# Patient Record
Sex: FEMALE | Race: WHITE | NOT HISPANIC OR LATINO | Employment: FULL TIME | URBAN - METROPOLITAN AREA
[De-identification: names, ages, dates, MRNs, and addresses within clinical notes are randomized per-mention and may not be internally consistent; named-entity substitution may affect disease eponyms.]

---

## 2023-06-08 LAB
CHLAMYDIA TRACH., AMPLIFIED: NEGATIVE
N. GONORRHEA, AMPLIFIED: NEGATIVE
TRICHOMONAS VAGINALIS: NEGATIVE

## 2024-02-22 ENCOUNTER — OFFICE VISIT (OUTPATIENT)
Dept: ORTHOPEDIC SURGERY | Facility: CLINIC | Age: 29
End: 2024-02-22
Payer: COMMERCIAL

## 2024-02-22 DIAGNOSIS — G56.03 BILATERAL CARPAL TUNNEL SYNDROME: ICD-10-CM

## 2024-02-22 PROCEDURE — 99203 OFFICE O/P NEW LOW 30 MIN: CPT | Performed by: FAMILY MEDICINE

## 2024-02-22 NOTE — PROGRESS NOTES
History of Present Illness   Chief Complaint   Patient presents with    Left Hand - Pain, Numbness    Right Hand - Pain, Numbness       The patient is 28 y.o. female  here with a complaint of bilateral hand pain and numbness, right greater than left.  Onset of symptoms over the past 9 months, atraumatic in nature.  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  She says that symptoms do tend to be aggravated by this, she does admit to some numbness and tingling when she wakes up in the morning, says she shakes her hand out to help with symptoms.  She points to the thenar eminence as area of discomfort, on her right hand she will get tingling into her middle and index finger, on the left is the middle and ring finger.  She has not done any specific treatment for her symptoms.  She denies any history of similar problems in the past.    History reviewed. No pertinent past medical history.    Medication Documentation Review Audit       Reviewed by Maverick Eli MA (Medical Assistant) on 02/22/24 at 1540      Medication Order Taking? Sig Documenting Provider Last Dose Status            No Medications to Display                                   No Known Allergies    Social History     Socioeconomic History    Marital status: Single     Spouse name: Not on file    Number of children: Not on file    Years of education: Not on file    Highest education level: Not on file   Occupational History    Not on file   Tobacco Use    Smoking status: Every Day     Types: Cigarettes    Smokeless tobacco: Not on file   Substance and Sexual Activity    Alcohol use: Not on file    Drug use: Not on file    Sexual activity: Not on file   Other Topics Concern    Not on file   Social History Narrative    Not on file     Social Determinants of Health     Financial Resource Strain: Not on file   Food Insecurity: Not on file   Transportation Needs: Not on file    Physical Activity: Not on file   Stress: Not on file   Social Connections: Not on file   Intimate Partner Violence: Not on file   Housing Stability: Not on file       History reviewed. No pertinent surgical history.       Review of Systems   GENERAL: Negative  GI: Negative  MUSCULOSKELETAL: See HPI  SKIN: Negative  NEURO:  Negative     Physical Exam:    General/Constitutional: well appearing, no distress, appears stated age  HEENT: sclera clear  Respiratory: non labored breathing  Vascular: No edema, swelling or tenderness, except as noted in detailed exam.  Integumentary: No impressive skin lesions present, except as noted in detailed exam.  Neurological:  Alert and oriented   Psychological:  Normal mood and affect.  Musculoskeletal: Normal, except as noted in detailed exam and in HPI    Lateral hands are normal in appearance, there is no thenar or intrinsic atrophy.  No areas of focal tenderness to palpation.  She has full range of motion of both wrists, there is no motor deficit or pain with strength testing at either wrist.  Negative Finkelstein test bilaterally.  She does have a positive Phalen, more prominent on the right, negative Tinel bilaterally, positive median nerve compression on the right, negative on the left.  There is no motor or sensory deficits of the median, ulnar, radial nerve distributions, 2+ radial pulse       Imaging: No imaging today      Assessment   1. Bilateral carpal tunnel syndrome  EMG & nerve conduction            Plan: Bilateral hand pain/paresthesias consistent with median nerve compression/carpal tunnel syndrome.  We discussed likely diagnosis, further workup and treatment.  I would like to obtain EMG/nerve conduction study of bilateral upper extremities to evaluate for median nerve compression at the wrist.  Patient says that she would be interested in more definitive management with carpal tunnel release if positive.  We discussed alternative treatments including bracing,  injections, therapy.  Further treatment pending EMG results

## 2024-05-17 ENCOUNTER — HOSPITAL ENCOUNTER (OUTPATIENT)
Dept: NEUROLOGY | Facility: HOSPITAL | Age: 29
Discharge: HOME | End: 2024-05-17
Payer: COMMERCIAL

## 2024-05-17 DIAGNOSIS — G56.03 BILATERAL CARPAL TUNNEL SYNDROME: ICD-10-CM

## 2024-05-17 PROCEDURE — 95886 MUSC TEST DONE W/N TEST COMP: CPT | Performed by: PSYCHIATRY & NEUROLOGY

## 2024-05-17 PROCEDURE — 95912 NRV CNDJ TEST 11-12 STUDIES: CPT | Performed by: PSYCHIATRY & NEUROLOGY

## 2024-05-17 PROCEDURE — 95912 NRV CNDJ TEST 11-12 STUDIES: CPT

## 2024-05-28 ENCOUNTER — OFFICE VISIT (OUTPATIENT)
Dept: ORTHOPEDIC SURGERY | Facility: CLINIC | Age: 29
End: 2024-05-28
Payer: COMMERCIAL

## 2024-05-28 ENCOUNTER — HOSPITAL ENCOUNTER (OUTPATIENT)
Dept: RADIOLOGY | Facility: EXTERNAL LOCATION | Age: 29
Discharge: HOME | End: 2024-05-28

## 2024-05-28 DIAGNOSIS — G56.01 CARPAL TUNNEL SYNDROME OF RIGHT WRIST: Primary | ICD-10-CM

## 2024-05-28 DIAGNOSIS — M79.641 HAND PAIN, RIGHT: ICD-10-CM

## 2024-05-28 PROCEDURE — 76942 ECHO GUIDE FOR BIOPSY: CPT | Performed by: FAMILY MEDICINE

## 2024-05-28 PROCEDURE — 20526 THER INJECTION CARP TUNNEL: CPT | Performed by: FAMILY MEDICINE

## 2024-05-28 PROCEDURE — 99213 OFFICE O/P EST LOW 20 MIN: CPT | Performed by: FAMILY MEDICINE

## 2024-05-28 RX ORDER — LIDOCAINE HYDROCHLORIDE 20 MG/ML
1 INJECTION, SOLUTION INFILTRATION; PERINEURAL
Status: COMPLETED | OUTPATIENT
Start: 2024-05-28 | End: 2024-05-28

## 2024-05-28 RX ORDER — TRIAMCINOLONE ACETONIDE 40 MG/ML
40 INJECTION, SUSPENSION INTRA-ARTICULAR; INTRAMUSCULAR
Status: COMPLETED | OUTPATIENT
Start: 2024-05-28 | End: 2024-05-28

## 2024-05-28 RX ADMIN — LIDOCAINE HYDROCHLORIDE 1 ML: 20 INJECTION, SOLUTION INFILTRATION; PERINEURAL at 15:33

## 2024-05-28 RX ADMIN — TRIAMCINOLONE ACETONIDE 40 MG: 40 INJECTION, SUSPENSION INTRA-ARTICULAR; INTRAMUSCULAR at 15:33

## 2024-05-28 NOTE — PROGRESS NOTES
History of Present Illness   Chief Complaint   Patient presents with    Right Hand - Follow-up       The patient is 28 y.o. female  here for follow-up of right hand pain/numbness/EMG review.  Patient was initially seen by me 3 months ago, see previous note for full details.  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  Symptoms, exam findings were consistent with carpal tunnel syndrome, she was referred for EMG/nerve conduction study, did recommend attempt at some nighttime splinting.  She says that her left hand has been asymptomatic but her right hand remains symptomatic.  She says that splinting at night has helped with her nighttime symptoms and morning symptoms but she is still having intermittent symptoms with her work activity, she says majority of discomfort is in the thenar eminence area with numbness into her thumb, occasionally into the tips of her index and middle finger    No past medical history on file.    Medication Documentation Review Audit       Reviewed by Aric Bower MD (Physician) on 02/22/24 at 1609      Medication Order Taking? Sig Documenting Provider Last Dose Status            No Medications to Display                                   No Known Allergies    Social History     Socioeconomic History    Marital status: Single     Spouse name: Not on file    Number of children: Not on file    Years of education: Not on file    Highest education level: Not on file   Occupational History    Not on file   Tobacco Use    Smoking status: Every Day     Types: Cigarettes    Smokeless tobacco: Not on file   Substance and Sexual Activity    Alcohol use: Not on file    Drug use: Not on file    Sexual activity: Not on file   Other Topics  Concern    Not on file   Social History Narrative    Not on file     Social Determinants of Health     Financial Resource Strain: Medium Risk (11/14/2023)    Received from [facility]    Overall Financial Resource Strain (CARDIA)     Difficulty of Paying Living Expenses: Somewhat hard   Food Insecurity: Food Insecurity Present (11/14/2023)    Received from [facility]    Hunger Vital Sign     Worried About Running Out of Food in the Last Year: Sometimes true     Ran Out of Food in the Last Year: Never true   Transportation Needs: No Transportation Needs (11/14/2023)    Received from [facility]    PRAPARE - Transportation     Lack of Transportation (Medical): No     Lack of Transportation (Non-Medical): No   Physical Activity: Sufficiently Active (11/14/2023)    Received from [facility]    Exercise Vital Sign     Days of Exercise per Week: 7 days     Minutes of Exercise per Session: 150+ min   Stress: No Stress Concern Present (11/14/2023)    Received from [facility]    [employer] [place] [employer] - Occupational Stress Questionnaire     Feeling of Stress : Not at all   Social Connections: Unknown (11/14/2023)    Received from [facility]    Social Connection and Isolation Panel [NHANES]     Frequency of Communication with Friends and Family: More than three times a week     Frequency of Social Gatherings with Friends and Family: More than three times a week     Attends Baptism Services: Not on file     Active Member of Clubs or Organizations: Not on file     Attends Club or Organization Meetings: Not on file     Marital Status: Not on file   Intimate Partner Violence: Not on file   Housing Stability: Not on file       No past surgical history on file.       Review of Systems   GENERAL: Negative  GI: Negative  MUSCULOSKELETAL: See HPI  SKIN: Negative  NEURO:  Negative      Physical Exam:    General/Constitutional: well appearing, no distress, appears stated age  HEENT: sclera clear  Respiratory: non labored breathing  Vascular: No edema, swelling or tenderness, except as noted in detailed exam.  Integumentary: No impressive skin lesions present, except as noted in detailed exam.  Neurological:  Alert and oriented   Psychological:  Normal mood and affect.  Musculoskeletal: Normal, except as noted in detailed exam and in HPI    Right hand/wrist: Normal in appearance, there is no thenar or intrinsic atrophy.  There is some tenderness at the carpal tunnel ligament area today more prominent near the radial aspect..  She has full range of motion of both wrists, there is no motor deficit or pain with strength testing at either wrist.  Negative Finkelstein test bilaterally.  She does have a positive Phalen, positive Tinel,, positive median nerve compression.  There is no motor or sensory deficits of the median, ulnar, radial nerve distributions, 2+ radial pulse       Results: EMG was reviewed with patient, unremarkable for carpal tunnel syndrome, other upper extremity neuropathy    Hand / UE Inj/Asp: R carpal tunnel for carpal tunnel syndrome on 5/28/2024 3:33 PM  Indications: diagnostic and pain  Details: 25 G needle, ultrasound-guided volar approach  Medications: 40 mg triamcinolone acetonide 40 mg/mL; 1 mL lidocaine 20 mg/mL (2 %)  Outcome: tolerated well, no immediate complications  Procedure, treatment alternatives, risks and benefits explained, specific risks discussed. Patient was prepped and draped in the usual sterile fashion.             Assessment   1. Carpal tunnel syndrome of right wrist        2. Hand pain, right  Point of Care Ultrasound            Plan: Right hand pain/paresthesias, exam, history suggestive of carpal tunnel syndrome though EMG was unremarkable.  Discussed further workup and treatment.  We did proceed with ultrasound-guided right wrist carpal tunnel injection  for both diagnostic and hopefully therapeutic purposes, she was instructed to reach out to the office in approximately 2 weeks to let us know her response to the injection to help further guide treatment moving forward.  All questions and concerns were answered.

## 2024-10-01 ENCOUNTER — APPOINTMENT (OUTPATIENT)
Dept: ORTHOPEDIC SURGERY | Facility: CLINIC | Age: 29
End: 2024-10-01
Payer: COMMERCIAL

## 2024-10-01 DIAGNOSIS — R20.2 PARESTHESIA OF HAND, BILATERAL: Primary | ICD-10-CM

## 2024-10-01 PROCEDURE — 99213 OFFICE O/P EST LOW 20 MIN: CPT | Performed by: FAMILY MEDICINE

## 2024-10-01 NOTE — PROGRESS NOTES
History of Present Illness   Chief Complaint   Patient presents with    Left Hand - Follow-up    Right Hand - Follow-up       The patient is 29 y.o. female  here for follow-up of bilateral hand pain/numbness.  Patient was last seen by me a little over 4 months ago, see previous note for full details.  In brief patient has been dealing with some ongoing pain, numbness and tingling in bilateral hands, right greater than left over the past year.  At her initial visit with me symptoms consistent with carpal tunnel syndrome bilaterally.  She was sent for EMG, she did have EMG done, and this was negative for any signs of carpal tunnel syndrome/median nerve compression bilaterally, no other neuropathies were seen.  At her visit in May we did proceed with ultrasound-guided carpal tunnel injection for both diagnostic and hopefully therapeutic purposes.  She says that a carpal tunnel injection did provide full resolution of symptoms for approximately 4 months with more recent recurrence of pain in the right hand and wrist, she says around the same time her left hand and wrist started bothering her again.  At her follow-up visit with me in May she says that her left hand was asymptomatic.  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  Pain is most notable in the thenar eminence bilaterally, she gets numbness and tingling into her thumb, index and middle fingers.        No past medical history on file.    Medication Documentation Review Audit       Reviewed by Aric Bower MD (Physician) on 05/28/24 at 1535      Medication Order Taking? Sig Documenting Provider Last Dose Status            No Medications to Display                                   No Known Allergies    Social History     Socioeconomic History    Marital status: Single     Spouse name: Not on file    Number of children: Not on file    Years of education: Not on file    Highest education level: Not on file   Occupational  History    Not on file   Tobacco Use    Smoking status: Every Day     Types: Cigarettes    Smokeless tobacco: Not on file   Substance and Sexual Activity    Alcohol use: Not on file    Drug use: Not on file    Sexual activity: Not on file   Other Topics Concern    Not on file   Social History Narrative    Not on file     Social Determinants of Health     Financial Resource Strain: Medium Risk (11/14/2023)    Received from [facility]    Overall Financial Resource Strain (CARDIA)     Difficulty of Paying Living Expenses: Somewhat hard   Food Insecurity: Food Insecurity Present (11/14/2023)    Received from [facility]    Hunger Vital Sign     Worried About Running Out of Food in the Last Year: Sometimes true     Ran Out of Food in the Last Year: Never true   Transportation Needs: No Transportation Needs (11/14/2023)    Received from [facility]    PRAPARE - Transportation     Lack of Transportation (Medical): No     Lack of Transportation (Non-Medical): No   Physical Activity: Sufficiently Active (11/14/2023)    Received from [facility]    Exercise Vital Sign     Days of Exercise per Week: 7 days     Minutes of Exercise per Session: 150+ min   Stress: No Stress Concern Present (11/14/2023)    Received from [facility]    [employer] [place] [employer] - Occupational Stress Questionnaire     Feeling of Stress : Not at all   Social Connections: Unknown (11/14/2023)    Received from [facility]    Social Connection and Isolation Panel [NHANES]     Frequency of Communication with Friends and Family: More than three times a week     Frequency of Social Gatherings with Friends and Family: More than three times a week     Attends Congregation Services: Not on file     Active Member of Clubs or Organizations: Not on file     Attends Club or Organization Meetings: Not on file     Marital  Status: Not on file   Intimate Partner Violence: Not on file   Housing Stability: Not on file       No past surgical history on file.       Review of Systems   GENERAL: Negative  GI: Negative  MUSCULOSKELETAL: See HPI  SKIN: Negative  NEURO:  Negative     Physical Exam:    General/Constitutional: well appearing, no distress, appears stated age  HEENT: sclera clear  Respiratory: non labored breathing  Vascular: No edema, swelling or tenderness, except as noted in detailed exam.  Integumentary: No impressive skin lesions present, except as noted in detailed exam.  Neurological:  Alert and oriented   Psychological:  Normal mood and affect.  Musculoskeletal: Normal, except as noted in detailed exam and in HPI    Right hand/wrist: Normal in appearance, there is no thenar or intrinsic atrophy.  There is some tenderness at the carpal tunnel ligament area today more prominent near the radial aspect..  She has full range of motion of both wrists, there is no motor deficit or pain with strength testing at either wrist.  Negative Finkelstein test bilaterally.  She does have a positive Phalen, positive Tinel,, positive median nerve compression.  There is no motor or sensory deficits of the median, ulnar, radial nerve distributions, 2+ radial pulse    Left hand/wrist: Normal appearance, no intrinsic or thenar atrophy.  No significant tenderness at the carpal tunnel ligament today.  No other areas of tenderness palpation.  She has good range of motion at the wrist in all directions without pain.  Positive Phalen, negative Tinel, negative median nerve compression on the left.  No motor or sensory deficits the median, ulnar, radial nerve distributions.  2+ radial pulse      Assessment   1. Paresthesia of hand, bilateral                Plan: Bilateral hand pain/paresthesias, symptoms consistent with carpal tunnel syndrome/median nerve compression at the wrist though previous EMG was negative, she did have significant improvement  following carpal tunnel injection of the right wrist little over 4 months ago with recent recurrence of pain.  Discussed further workup and treatment.  I would like patient to follow-up with Dr. Cornejo, discussed possibility of false negative EMG, patient is in agreement with plan.  She will follow-up with him in the next few weeks

## 2024-10-07 ENCOUNTER — OFFICE VISIT (OUTPATIENT)
Dept: ORTHOPEDIC SURGERY | Facility: CLINIC | Age: 29
End: 2024-10-07
Payer: COMMERCIAL

## 2024-10-07 VITALS — BODY MASS INDEX: 20.49 KG/M2 | WEIGHT: 120 LBS | HEIGHT: 64 IN

## 2024-10-07 DIAGNOSIS — G56.01 CARPAL TUNNEL SYNDROME ON RIGHT: Primary | ICD-10-CM

## 2024-10-07 PROCEDURE — 99213 OFFICE O/P EST LOW 20 MIN: CPT | Performed by: ORTHOPAEDIC SURGERY

## 2024-10-07 PROCEDURE — 3008F BODY MASS INDEX DOCD: CPT | Performed by: ORTHOPAEDIC SURGERY

## 2024-10-07 RX ORDER — BUPIVACAINE HYDROCHLORIDE 5 MG/ML
10 INJECTION, SOLUTION EPIDURAL; INTRACAUDAL ONCE
OUTPATIENT
Start: 2024-10-07 | End: 2024-10-07

## 2024-10-07 RX ORDER — SODIUM CHLORIDE, SODIUM LACTATE, POTASSIUM CHLORIDE, CALCIUM CHLORIDE 600; 310; 30; 20 MG/100ML; MG/100ML; MG/100ML; MG/100ML
20 INJECTION, SOLUTION INTRAVENOUS CONTINUOUS
OUTPATIENT
Start: 2024-10-07

## 2024-10-07 NOTE — PROGRESS NOTES
Subjective    Patient ID: Tomasa Pennington is a 29 y.o. female.    Chief Complaint: Follow-up of the Right Hand and Follow-up of the Left Hand     Last Surgery: No surgery found  Last Surgery Date: No surgery found    HPI  The patient is referred today for evaluation of bilateral hand numbness.  She does work as a andrade.  She was originally worked up by Dr. Bower who had her undergo bilateral upper extremity EMGs.  She was initially treated with Kenalog injections because of numbness.  They did help especially on the right but her symptoms have recurred.  She has not noticed nearly as much symptoms on the left side.  She is here mostly because of the right hand.    Objective   The patient is in no acute distress.  Exam of her right hand and wrist reveals her skin envelope is intact.  She has no thenar or intrinsic atrophy.  She has tenderness over the carpal canal.  She has a negative Tinel's test but a positive Phalen test and a positive carpal tunnel compression test.    Assessment/Plan   Encounter Diagnoses:  Carpal tunnel syndrome on right    Orders Placed This Encounter    Request for Pre-Admission Testing Visit    Case Request Operating Room: Decompression Median Nerve with Carpal Tunnel Release     The patient's right hand his symptoms are essentially secondary to a flexor tendinitis that is irritating the median nerve within the carpal canal.  She had undergone a previous Kenalog injection but her symptoms continue to recur.  We then discussed surgical treatment options including a right carpal tunnel release.  I discussed with her in detail the risk, benefits alternatives of a right carpal tunnel release.  The patient voiced understanding and informed consent was obtained.  The patient will call to schedule surgery.

## 2024-10-10 PROBLEM — G56.01 CARPAL TUNNEL SYNDROME ON RIGHT: Status: ACTIVE | Noted: 2024-10-07

## 2024-12-16 ENCOUNTER — PRE-ADMISSION TESTING (OUTPATIENT)
Dept: PREADMISSION TESTING | Facility: HOSPITAL | Age: 29
End: 2024-12-16
Payer: COMMERCIAL

## 2024-12-16 VITALS
RESPIRATION RATE: 20 BRPM | TEMPERATURE: 96.8 F | DIASTOLIC BLOOD PRESSURE: 76 MMHG | OXYGEN SATURATION: 100 % | HEIGHT: 64 IN | SYSTOLIC BLOOD PRESSURE: 119 MMHG | HEART RATE: 92 BPM | BODY MASS INDEX: 21.15 KG/M2 | WEIGHT: 123.9 LBS

## 2024-12-16 DIAGNOSIS — G56.01 CARPAL TUNNEL SYNDROME OF RIGHT WRIST: ICD-10-CM

## 2024-12-16 DIAGNOSIS — Z01.818 PREOP TESTING: Primary | ICD-10-CM

## 2024-12-16 LAB
ANION GAP SERPL CALC-SCNC: 8 MMOL/L (ref 10–20)
BUN SERPL-MCNC: 8 MG/DL (ref 6–23)
CALCIUM SERPL-MCNC: 9.6 MG/DL (ref 8.6–10.3)
CHLORIDE SERPL-SCNC: 106 MMOL/L (ref 98–107)
CO2 SERPL-SCNC: 28 MMOL/L (ref 21–32)
CREAT SERPL-MCNC: 0.63 MG/DL (ref 0.5–1.05)
EGFRCR SERPLBLD CKD-EPI 2021: >90 ML/MIN/1.73M*2
GLUCOSE SERPL-MCNC: 88 MG/DL (ref 74–99)
HCT VFR BLD AUTO: 39.7 % (ref 36–46)
HGB BLD-MCNC: 13.6 G/DL (ref 12–16)
POTASSIUM SERPL-SCNC: 4.2 MMOL/L (ref 3.5–5.3)
SODIUM SERPL-SCNC: 138 MMOL/L (ref 136–145)

## 2024-12-16 PROCEDURE — 93005 ELECTROCARDIOGRAM TRACING: CPT

## 2024-12-16 PROCEDURE — 93010 ELECTROCARDIOGRAM REPORT: CPT | Performed by: STUDENT IN AN ORGANIZED HEALTH CARE EDUCATION/TRAINING PROGRAM

## 2024-12-16 PROCEDURE — 80048 BASIC METABOLIC PNL TOTAL CA: CPT

## 2024-12-16 PROCEDURE — 36415 COLL VENOUS BLD VENIPUNCTURE: CPT

## 2024-12-16 PROCEDURE — 99243 OFF/OP CNSLTJ NEW/EST LOW 30: CPT | Performed by: NURSE PRACTITIONER

## 2024-12-16 PROCEDURE — 85014 HEMATOCRIT: CPT

## 2024-12-16 ASSESSMENT — DUKE ACTIVITY SCORE INDEX (DASI)
CAN YOU DO YARD WORK LIKE RAKING LEAVES, WEEDING OR PUSHING A MOWER: YES
TOTAL_SCORE: 44.7
CAN YOU PARTICIPATE IN STRENOUS SPORTS LIKE SWIMMING, SINGLES TENNIS, FOOTBALL, BASKETBALL, OR SKIING: NO
CAN YOU TAKE CARE OF YOURSELF (EAT, DRESS, BATHE, OR USE TOILET): YES
CAN YOU PARTICIPATE IN MODERATE RECREATIONAL ACTIVITIES LIKE GOLF, BOWLING, DANCING, DOUBLES TENNIS OR THROWING A BASEBALL OR FOOTBALL: NO
CAN YOU WALK INDOORS, SUCH AS AROUND YOUR HOUSE: YES
CAN YOU CLIMB A FLIGHT OF STAIRS OR WALK UP A HILL: YES
CAN YOU DO LIGHT WORK AROUND THE HOUSE LIKE DUSTING OR WASHING DISHES: YES
DASI METS SCORE: 8.2
CAN YOU HAVE SEXUAL RELATIONS: YES
CAN YOU RUN A SHORT DISTANCE: YES
CAN YOU DO HEAVY WORK AROUND THE HOUSE LIKE SCRUBBING FLOORS OR LIFTING AND MOVING HEAVY FURNITURE: YES
CAN YOU WALK A BLOCK OR TWO ON LEVEL GROUND: YES
CAN YOU DO MODERATE WORK AROUND THE HOUSE LIKE VACUUMING, SWEEPING FLOORS OR CARRYING GROCERIES: YES

## 2024-12-16 ASSESSMENT — PAIN SCALES - GENERAL: PAINLEVEL_OUTOF10: 0 - NO PAIN

## 2024-12-16 ASSESSMENT — PAIN - FUNCTIONAL ASSESSMENT: PAIN_FUNCTIONAL_ASSESSMENT: 0-10

## 2024-12-16 NOTE — CPM/PAT H&P
CPM/PAT Evaluation       Name: Tomasa Pennington (Tomasa Pennington)  /Age:  y.o.     In-Person       Chief Complaint: PAT for planned carpal tunnel release    29 yr old female w/no significant hx, currently with Right carpal tunnel syndrome referred to PAT for planned Right carpal tunnel release w/Dr Cornejo on 2024       Patient reports feeling overall well, denies fever, cough or recent infection. 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 No previous surgery.        Reports no current PCP; was seen by Abi ROOT (PCP) on 2023 for follow up after Mirena.         No past medical history on file.    No past surgical history on file.    Patient  has no history on file for sexual activity.    No family history on file.    No Known Allergies    Prior to Admission medications    Medication Sig Start Date End Date Taking? Authorizing Provider   levonorgestrel (Mirena) 20 mcg/24hr IUD 52 mg by intrauterine route.    Historical Provider, MD        Review of Systems    Constitutional: no fever, no chills and not feeling poorly.   Eyes: no eyesight problems.   ENT: no hearing loss, no nosebleeds and no sore throat.   Cardiovascular: no chest pain, no palpitations and no extremity edema.   Respiratory: no sob, no wheezing, no cough and no sob w/exertion.   Gastrointestinal: negative for abdominal pain, blood in stools or changes in bowel habits   Genitourinary: negative for dysuria, incontinence or changes in urinary habits   Musculoskeletal: no arthralgias, ambulates independently.   Integumentary: negative for lesions, rash or itching.   Neurological: negative for confusion, dizziness or fainting.   Psychiatric: not suicidal, no anxiety and no depression.   All other systems have been reviewed and are negative for complaint.     Physical Exam  Vitals reviewed.   Constitutional:       Appearance:  Normal appearance.   HENT:      Head: Normocephalic.      Mouth/Throat:      Mouth: Mucous membranes are moist.   Eyes:      Pupils: Pupils are equal, round, and reactive to light.   Cardiovascular:      Rate and Rhythm: Normal rate and regular rhythm.   Pulmonary:      Effort: Pulmonary effort is normal.      Breath sounds: Normal breath sounds.   Abdominal:      General: Bowel sounds are normal.   Musculoskeletal:         General: Normal range of motion.   Skin:     General: Skin is warm and dry.   Neurological:      Mental Status: She is alert and oriented to person, place, and time.   Psychiatric:         Mood and Affect: Mood normal.        PAT AIRWAY:   Airway:     Mallampati::  I    TM distance::  >3 FB    Neck ROM::  Full  normal        Testing/Diagnostic: BMP, H&H, ecg    Patient Specialist/PCP: No current PCP Abi ROOT (PCP) 11/14/2023    Visit Vitals  Smoking Status Every Day       DASI Risk Score      Flowsheet Row Questionnaire Series Submission from 12/15/2024 in [facility] with Generic Provider Pedro   Can you take care of yourself (eat, dress, bathe, or use toilet)?  2.75  filed at 12/15/2024 2020   Can you walk indoors, such as around your house? 1.75  filed at 12/15/2024 2020   Can you walk a block or two on level ground?  2.75  filed at 12/15/2024 2020   Can you climb a flight of stairs or walk up a hill? 5.5  filed at 12/15/2024 2020   Can you run a short distance? 8  filed at 12/15/2024 2020   Can you do light work around the house like dusting or washing dishes? 2.7  filed at 12/15/2024 2020   Can you do moderate work around the house like vacuuming, sweeping floors or carrying groceries? 3.5  filed at 12/15/2024 2020   Can you do heavy work around the house like scrubbing floors or lifting and moving heavy furniture?  8  filed at 12/15/2024 2020   Can you do yard work like raking leaves, weeding or pushing a mower? 4.5  filed at 12/15/2024 2020   Can you have sexual relations?  5.25  filed at 12/15/2024 2020   Can you participate in moderate recreational activities like golf, bowling, dancing, doubles tennis or throwing a baseball or football? 6  filed at 12/15/2024 2020   Can you participate in strenous sports like swimming, singles tennis, football, basketball, or skiing? 7.5  filed at 12/15/2024 2020   DASI SCORE 58.2  filed at 12/15/2024 2020   METS Score (Will be calculated only when all the questions are answered) 9.9  filed at 12/15/2024 2020          Caprini DVT Assessment    No data to display       Modified Frailty Index    No data to display       CHADS2 Stroke Risk  Current as of 40 minutes ago        N/A 3 to 100%: High Risk   2 to < 3%: Medium Risk   0 to < 2%: Low Risk     Last Change: N/A          This score determines the patient's risk of having a stroke if the patient has atrial fibrillation.        This score is not applicable to this patient. Components are not calculated.          Revised Cardiac Risk Index    No data to display       Apfel Simplified Score    No data to display       Risk Analysis Index Results This Encounter    No data found in the last 10 encounters.       Stop Bang Score      Flowsheet Row Questionnaire Series Submission from 12/15/2024 in [facility] with Generic Provider Pedro   Do you snore loudly? 1  filed at 12/15/2024 2020   Do you often feel tired or fatigued after your sleep? 0  filed at 12/15/2024 2020   Has anyone ever observed you stop breathing in your sleep? 0  filed at 12/15/2024 2020   Do you have or are you being treated for high blood pressure? 0  filed at 12/15/2024 2020   Recent BMI (Calculated) 20.6  filed at 12/15/2024 2020   Is BMI greater than 35 kg/m2? 0=No  filed at 12/15/2024 2020   Age older than 50 years old? 0=No  filed at 12/15/2024 2020   Gender - Male 0=No  filed at 12/15/2024 2020          Prodigy: High Risk  Total Score: 0          ARISCAT Score for Postoperative Pulmonary Complications    No data to display        Sarah Perioperative Risk for Myocardial Infarction or Cardiac Arrest (RUTH)    No data to display         Assessment and Plan:     # Tobacco use - cessation discussed/encouraged - states has cut back some especially in winter; adding hardly smokes in winter.   # Right carpal tunnel syndrome - Right carpal tunnel release w/Dr Cornejo on 12/27/2024     Ecg performed today - NSR (93 bpm)  Medical hx, Allergies, VS and Labs reviewed  Medications addressed w/pre-op instructions provided

## 2024-12-16 NOTE — H&P (VIEW-ONLY)
CPM/PAT Evaluation       Name: Tomasa Pennington (Tomasa Pennington)  /Age:  y.o.     In-Person       Chief Complaint: PAT for planned carpal tunnel release    29 yr old female w/no significant hx, currently with Right carpal tunnel syndrome referred to PAT for planned Right carpal tunnel release w/Dr Cornejo on 2024       Patient reports feeling overall well, denies fever, cough or recent infection. 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 No previous surgery.        Reports no current PCP; was seen by Abi ROOT (PCP) on 2023 for follow up after Mirena.         No past medical history on file.    No past surgical history on file.    Patient  has no history on file for sexual activity.    No family history on file.    No Known Allergies    Prior to Admission medications    Medication Sig Start Date End Date Taking? Authorizing Provider   levonorgestrel (Mirena) 20 mcg/24hr IUD 52 mg by intrauterine route.    Historical Provider, MD        Review of Systems    Constitutional: no fever, no chills and not feeling poorly.   Eyes: no eyesight problems.   ENT: no hearing loss, no nosebleeds and no sore throat.   Cardiovascular: no chest pain, no palpitations and no extremity edema.   Respiratory: no sob, no wheezing, no cough and no sob w/exertion.   Gastrointestinal: negative for abdominal pain, blood in stools or changes in bowel habits   Genitourinary: negative for dysuria, incontinence or changes in urinary habits   Musculoskeletal: no arthralgias, ambulates independently.   Integumentary: negative for lesions, rash or itching.   Neurological: negative for confusion, dizziness or fainting.   Psychiatric: not suicidal, no anxiety and no depression.   All other systems have been reviewed and are negative for complaint.     Physical Exam  Vitals reviewed.   Constitutional:       Appearance:  Normal appearance.   HENT:      Head: Normocephalic.      Mouth/Throat:      Mouth: Mucous membranes are moist.   Eyes:      Pupils: Pupils are equal, round, and reactive to light.   Cardiovascular:      Rate and Rhythm: Normal rate and regular rhythm.   Pulmonary:      Effort: Pulmonary effort is normal.      Breath sounds: Normal breath sounds.   Abdominal:      General: Bowel sounds are normal.   Musculoskeletal:         General: Normal range of motion.   Skin:     General: Skin is warm and dry.   Neurological:      Mental Status: She is alert and oriented to person, place, and time.   Psychiatric:         Mood and Affect: Mood normal.        PAT AIRWAY:   Airway:     Mallampati::  I    TM distance::  >3 FB    Neck ROM::  Full  normal        Testing/Diagnostic: BMP, H&H, ecg    Patient Specialist/PCP: No current PCP Abi ROOT (PCP) 11/14/2023    Visit Vitals  Smoking Status Every Day       DASI Risk Score      Flowsheet Row Questionnaire Series Submission from 12/15/2024 in [facility] with Generic Provider Pedro   Can you take care of yourself (eat, dress, bathe, or use toilet)?  2.75  filed at 12/15/2024 2020   Can you walk indoors, such as around your house? 1.75  filed at 12/15/2024 2020   Can you walk a block or two on level ground?  2.75  filed at 12/15/2024 2020   Can you climb a flight of stairs or walk up a hill? 5.5  filed at 12/15/2024 2020   Can you run a short distance? 8  filed at 12/15/2024 2020   Can you do light work around the house like dusting or washing dishes? 2.7  filed at 12/15/2024 2020   Can you do moderate work around the house like vacuuming, sweeping floors or carrying groceries? 3.5  filed at 12/15/2024 2020   Can you do heavy work around the house like scrubbing floors or lifting and moving heavy furniture?  8  filed at 12/15/2024 2020   Can you do yard work like raking leaves, weeding or pushing a mower? 4.5  filed at 12/15/2024 2020   Can you have sexual relations?  5.25  filed at 12/15/2024 2020   Can you participate in moderate recreational activities like golf, bowling, dancing, doubles tennis or throwing a baseball or football? 6  filed at 12/15/2024 2020   Can you participate in strenous sports like swimming, singles tennis, football, basketball, or skiing? 7.5  filed at 12/15/2024 2020   DASI SCORE 58.2  filed at 12/15/2024 2020   METS Score (Will be calculated only when all the questions are answered) 9.9  filed at 12/15/2024 2020          Caprini DVT Assessment    No data to display       Modified Frailty Index    No data to display       CHADS2 Stroke Risk  Current as of 40 minutes ago        N/A 3 to 100%: High Risk   2 to < 3%: Medium Risk   0 to < 2%: Low Risk     Last Change: N/A          This score determines the patient's risk of having a stroke if the patient has atrial fibrillation.        This score is not applicable to this patient. Components are not calculated.          Revised Cardiac Risk Index    No data to display       Apfel Simplified Score    No data to display       Risk Analysis Index Results This Encounter    No data found in the last 10 encounters.       Stop Bang Score      Flowsheet Row Questionnaire Series Submission from 12/15/2024 in [facility] with Generic Provider Pedro   Do you snore loudly? 1  filed at 12/15/2024 2020   Do you often feel tired or fatigued after your sleep? 0  filed at 12/15/2024 2020   Has anyone ever observed you stop breathing in your sleep? 0  filed at 12/15/2024 2020   Do you have or are you being treated for high blood pressure? 0  filed at 12/15/2024 2020   Recent BMI (Calculated) 20.6  filed at 12/15/2024 2020   Is BMI greater than 35 kg/m2? 0=No  filed at 12/15/2024 2020   Age older than 50 years old? 0=No  filed at 12/15/2024 2020   Gender - Male 0=No  filed at 12/15/2024 2020          Prodigy: High Risk  Total Score: 0          ARISCAT Score for Postoperative Pulmonary Complications    No data to display        Sarah Perioperative Risk for Myocardial Infarction or Cardiac Arrest (RUTH)    No data to display         Assessment and Plan:     # Tobacco use - cessation discussed/encouraged - states has cut back some especially in winter; adding hardly smokes in winter.   # Right carpal tunnel syndrome - Right carpal tunnel release w/Dr Cornejo on 12/27/2024     Ecg performed today - NSR (93 bpm)  Medical hx, Allergies, VS and Labs reviewed  Medications addressed w/pre-op instructions provided

## 2024-12-16 NOTE — PREPROCEDURE INSTRUCTIONS
Medication List            Accurate as of December 16, 2024  9:19 AM. Always use your most recent med list.                levonorgestrel 20 mcg/24hr IUD  Commonly known as: Mirena                              NPO Instructions:    Do not eat any food after midnight the night before your surgery/procedure.    Additional Instructions:     Day of Surgery:  Review your medication instructions, take indicated medications  You may have clear liquids until TWO hours before surgery/procedure.  This includes water, black tea/coffee, (no milk or cream) apple juice and electrolyte drinks (Gatorade)  Wear  comfortable loose fitting clothing  Do not use moisturizers, creams, lotions or perfume  All jewelry and valuables should be left at home            PRE-OPERATIVE INSTRUCTIONS FOR SURGERY    *Do not eat anything after midnight the night of surgery.  This includes food of any kind (including hard candy, cough drops, mints).   You may have up to 13 ounces of clear liquid  until TWO hours prior to your scheduled surgery time, unless ERAS* protocol is ordered for you.  Clear liquids include water, black tea/coffee, (no milk or cream) apple juice and electrolyte drinks (GATORADE).  You may chew gum until TWO hours prior you your surgery/procedure.     *ERAS protocol: follow as instructed.  DO not drink an additional 13 ounces as noted above.    SHOWER THE DAY BEFORE SURGERY AND MORNING OF SURGERY    *One of our staff members will call you ONE business day before your surgery, between 11am-2 pm to let you know the time to arrive.  If you have not received a call by 2 pm, call [phone]  *When you arrive at the hospital-->GO TO Registration on the ground floor  *Stop smoking 24 hours prior to surgery.  No Marijuana, CBD Oil or Vaping for 48 hours  *No alcohol 24 hours prior to surgery  *You will need a responsible adult to drive you home  -No acrylic nails or nail polish on at least one fingernail, NO polish on toes for foot  surgery  -You may be asked to remove your dentures, partial plate, eyeglasses or contact lenses before going to surgery.  Please bring a case for these items.  -Body piercings need to be removed.  Jewelry and valuables should be left at home.  -Put on loose,  comfortable, clean clothing, that will accommodate bandages      What you may be asked to bring to surgery:  ___Crutches, walker  ___CPAP machine  ___Urine specimen bring

## 2024-12-21 LAB
ATRIAL RATE: 93 BPM
P AXIS: 81 DEGREES
P OFFSET: 197 MS
P ONSET: 148 MS
PR INTERVAL: 142 MS
Q ONSET: 219 MS
QRS COUNT: 15 BEATS
QRS DURATION: 80 MS
QT INTERVAL: 350 MS
QTC CALCULATION(BAZETT): 435 MS
QTC FREDERICIA: 405 MS
R AXIS: 74 DEGREES
T AXIS: 65 DEGREES
T OFFSET: 394 MS
VENTRICULAR RATE: 93 BPM

## 2024-12-27 ENCOUNTER — HOSPITAL ENCOUNTER (OUTPATIENT)
Facility: HOSPITAL | Age: 29
Setting detail: OUTPATIENT SURGERY
Discharge: HOME | End: 2024-12-27
Attending: ORTHOPAEDIC SURGERY | Admitting: ORTHOPAEDIC SURGERY
Payer: COMMERCIAL

## 2024-12-27 ENCOUNTER — ANESTHESIA (OUTPATIENT)
Dept: OPERATING ROOM | Facility: HOSPITAL | Age: 29
End: 2024-12-27
Payer: COMMERCIAL

## 2024-12-27 ENCOUNTER — ANESTHESIA EVENT (OUTPATIENT)
Dept: OPERATING ROOM | Facility: HOSPITAL | Age: 29
End: 2024-12-27
Payer: COMMERCIAL

## 2024-12-27 VITALS
DIASTOLIC BLOOD PRESSURE: 58 MMHG | SYSTOLIC BLOOD PRESSURE: 109 MMHG | BODY MASS INDEX: 21.15 KG/M2 | TEMPERATURE: 98.2 F | HEIGHT: 64 IN | OXYGEN SATURATION: 100 % | HEART RATE: 71 BPM | WEIGHT: 123.9 LBS | RESPIRATION RATE: 16 BRPM

## 2024-12-27 DIAGNOSIS — G56.01 CARPAL TUNNEL SYNDROME ON RIGHT: Primary | ICD-10-CM

## 2024-12-27 LAB — PREGNANCY TEST URINE, POC: NEGATIVE

## 2024-12-27 PROCEDURE — 3700000002 HC GENERAL ANESTHESIA TIME - EACH INCREMENTAL 1 MINUTE: Performed by: ORTHOPAEDIC SURGERY

## 2024-12-27 PROCEDURE — 2500000004 HC RX 250 GENERAL PHARMACY W/ HCPCS (ALT 636 FOR OP/ED): Performed by: ORTHOPAEDIC SURGERY

## 2024-12-27 PROCEDURE — 81025 URINE PREGNANCY TEST: CPT | Performed by: ORTHOPAEDIC SURGERY

## 2024-12-27 PROCEDURE — 2500000004 HC RX 250 GENERAL PHARMACY W/ HCPCS (ALT 636 FOR OP/ED)

## 2024-12-27 PROCEDURE — 7100000009 HC PHASE TWO TIME - INITIAL BASE CHARGE: Performed by: ORTHOPAEDIC SURGERY

## 2024-12-27 PROCEDURE — 3600000003 HC OR TIME - INITIAL BASE CHARGE - PROCEDURE LEVEL THREE: Performed by: ORTHOPAEDIC SURGERY

## 2024-12-27 PROCEDURE — 3600000008 HC OR TIME - EACH INCREMENTAL 1 MINUTE - PROCEDURE LEVEL THREE: Performed by: ORTHOPAEDIC SURGERY

## 2024-12-27 PROCEDURE — 2500000005 HC RX 250 GENERAL PHARMACY W/O HCPCS: Performed by: ORTHOPAEDIC SURGERY

## 2024-12-27 PROCEDURE — 64721 CARPAL TUNNEL SURGERY: CPT | Performed by: ORTHOPAEDIC SURGERY

## 2024-12-27 PROCEDURE — 7100000010 HC PHASE TWO TIME - EACH INCREMENTAL 1 MINUTE: Performed by: ORTHOPAEDIC SURGERY

## 2024-12-27 PROCEDURE — 3700000001 HC GENERAL ANESTHESIA TIME - INITIAL BASE CHARGE: Performed by: ORTHOPAEDIC SURGERY

## 2024-12-27 PROCEDURE — 2720000007 HC OR 272 NO HCPCS: Performed by: ORTHOPAEDIC SURGERY

## 2024-12-27 RX ORDER — BUPIVACAINE HYDROCHLORIDE 5 MG/ML
INJECTION, SOLUTION PERINEURAL AS NEEDED
Status: DISCONTINUED | OUTPATIENT
Start: 2024-12-27 | End: 2024-12-27 | Stop reason: HOSPADM

## 2024-12-27 RX ORDER — HYDROMORPHONE HYDROCHLORIDE 1 MG/ML
1 INJECTION, SOLUTION INTRAMUSCULAR; INTRAVENOUS; SUBCUTANEOUS EVERY 5 MIN PRN
Status: DISCONTINUED | OUTPATIENT
Start: 2024-12-27 | End: 2024-12-27 | Stop reason: HOSPADM

## 2024-12-27 RX ORDER — DIPHENHYDRAMINE HYDROCHLORIDE 50 MG/ML
12.5 INJECTION INTRAMUSCULAR; INTRAVENOUS ONCE AS NEEDED
Status: DISCONTINUED | OUTPATIENT
Start: 2024-12-27 | End: 2024-12-27 | Stop reason: HOSPADM

## 2024-12-27 RX ORDER — LIDOCAINE HCL/PF 100 MG/5ML
SYRINGE (ML) INTRAVENOUS AS NEEDED
Status: DISCONTINUED | OUTPATIENT
Start: 2024-12-27 | End: 2024-12-27

## 2024-12-27 RX ORDER — SODIUM CHLORIDE 0.9 G/100ML
IRRIGANT IRRIGATION AS NEEDED
Status: DISCONTINUED | OUTPATIENT
Start: 2024-12-27 | End: 2024-12-27 | Stop reason: HOSPADM

## 2024-12-27 RX ORDER — SODIUM CHLORIDE, SODIUM LACTATE, POTASSIUM CHLORIDE, CALCIUM CHLORIDE 600; 310; 30; 20 MG/100ML; MG/100ML; MG/100ML; MG/100ML
20 INJECTION, SOLUTION INTRAVENOUS CONTINUOUS
Status: DISCONTINUED | OUTPATIENT
Start: 2024-12-27 | End: 2024-12-27 | Stop reason: HOSPADM

## 2024-12-27 RX ORDER — ACETAMINOPHEN 325 MG/1
650 TABLET ORAL EVERY 4 HOURS PRN
Status: DISCONTINUED | OUTPATIENT
Start: 2024-12-27 | End: 2024-12-27 | Stop reason: HOSPADM

## 2024-12-27 RX ORDER — ONDANSETRON HYDROCHLORIDE 2 MG/ML
4 INJECTION, SOLUTION INTRAVENOUS ONCE AS NEEDED
Status: DISCONTINUED | OUTPATIENT
Start: 2024-12-27 | End: 2024-12-27 | Stop reason: HOSPADM

## 2024-12-27 RX ORDER — SODIUM CHLORIDE, SODIUM LACTATE, POTASSIUM CHLORIDE, CALCIUM CHLORIDE 600; 310; 30; 20 MG/100ML; MG/100ML; MG/100ML; MG/100ML
100 INJECTION, SOLUTION INTRAVENOUS CONTINUOUS
Status: DISCONTINUED | OUTPATIENT
Start: 2024-12-27 | End: 2024-12-27 | Stop reason: HOSPADM

## 2024-12-27 RX ORDER — MEPERIDINE HYDROCHLORIDE 25 MG/ML
12.5 INJECTION INTRAMUSCULAR; INTRAVENOUS; SUBCUTANEOUS EVERY 10 MIN PRN
Status: DISCONTINUED | OUTPATIENT
Start: 2024-12-27 | End: 2024-12-27 | Stop reason: HOSPADM

## 2024-12-27 RX ORDER — BUPIVACAINE HYDROCHLORIDE 5 MG/ML
10 INJECTION, SOLUTION EPIDURAL; INTRACAUDAL ONCE
Status: DISCONTINUED | OUTPATIENT
Start: 2024-12-27 | End: 2024-12-27 | Stop reason: HOSPADM

## 2024-12-27 RX ORDER — LABETALOL HYDROCHLORIDE 5 MG/ML
10 INJECTION, SOLUTION INTRAVENOUS ONCE AS NEEDED
Status: DISCONTINUED | OUTPATIENT
Start: 2024-12-27 | End: 2024-12-27 | Stop reason: HOSPADM

## 2024-12-27 RX ORDER — HYDRALAZINE HYDROCHLORIDE 20 MG/ML
10 INJECTION INTRAMUSCULAR; INTRAVENOUS EVERY 30 MIN PRN
Status: DISCONTINUED | OUTPATIENT
Start: 2024-12-27 | End: 2024-12-27 | Stop reason: HOSPADM

## 2024-12-27 RX ORDER — PROPOFOL 10 MG/ML
INJECTION, EMULSION INTRAVENOUS AS NEEDED
Status: DISCONTINUED | OUTPATIENT
Start: 2024-12-27 | End: 2024-12-27

## 2024-12-27 SDOH — HEALTH STABILITY: MENTAL HEALTH: CURRENT SMOKER: 1

## 2024-12-27 ASSESSMENT — PAIN SCALES - GENERAL
PAINLEVEL_OUTOF10: 0 - NO PAIN

## 2024-12-27 ASSESSMENT — COLUMBIA-SUICIDE SEVERITY RATING SCALE - C-SSRS
2. HAVE YOU ACTUALLY HAD ANY THOUGHTS OF KILLING YOURSELF?: NO
6. HAVE YOU EVER DONE ANYTHING, STARTED TO DO ANYTHING, OR PREPARED TO DO ANYTHING TO END YOUR LIFE?: NO
1. IN THE PAST MONTH, HAVE YOU WISHED YOU WERE DEAD OR WISHED YOU COULD GO TO SLEEP AND NOT WAKE UP?: NO

## 2024-12-27 ASSESSMENT — PAIN - FUNCTIONAL ASSESSMENT: PAIN_FUNCTIONAL_ASSESSMENT: 0-10

## 2024-12-27 NOTE — ANESTHESIA POSTPROCEDURE EVALUATION
Patient: Tomasa Pennington    Procedure Summary       Date: 12/27/24 Room / Location: [facility] OR 07 / Virtual [facility] OR    Anesthesia Start: 1124 Anesthesia Stop: 1155    Procedure: RIGHT CARPAL TUNNEL RELEASE (Right: Wrist) Diagnosis:       Carpal tunnel syndrome on right      (Carpal tunnel syndrome on right [G56.01])    Surgeons: Filippo Cornejo MD Responsible Provider: Lico Vera MD    Anesthesia Type: MAC ASA Status: 2            Anesthesia Type: MAC    Vitals Value Taken Time   BP 90/55 12/27/24 1154   Temp 36.8 12/27/24 1155   Pulse 80 12/27/24 1154   Resp 16 12/27/24 1155   SpO2 100 % 12/27/24 1154   Vitals shown include unfiled device data.    Anesthesia Post Evaluation    Patient participation: complete - patient participated  Level of consciousness: awake  Pain management: adequate  Airway patency: patent  Cardiovascular status: acceptable  Respiratory status: acceptable  Hydration status: acceptable  Postoperative Nausea and Vomiting: none        No notable events documented.

## 2024-12-27 NOTE — OP NOTE
RIGHT CARPAL TUNNEL RELEASE (R) Operative Note     Date: 2024  OR Location: [facility] OR    Name: Tomasa Pennington, : 1995, Age: 29 y.o., MRN: [id number], Sex: female    Diagnosis  Pre-op Diagnosis      * Carpal tunnel syndrome on right [G56.01] Post-op Diagnosis     * Carpal tunnel syndrome on right [G56.01]     Procedures  RIGHT CARPAL TUNNEL RELEASE  53361 - ID NEUROPLASTY &/TRANSPOS MEDIAN NRV CARPAL TUNNE      Surgeons      * Filippo Cornejo - Primary    Resident/Fellow/Other Assistant:  Surgeons and Role:  * No surgeons found with a matching role *    Staff:   Circulator: Annamarie  Surgical Assistant: Merna Burchub Person: Jeronimo Doe Scrub: Georgia Doe Circulator: Billie    Anesthesia Staff: Anesthesiologist: Lico Vera MD  C-AA: CARI Hyman  SRNA: Melissa Hooper    Procedure Summary  Anesthesia: Monitor Anesthesia Care  ASA: II  Estimated Blood Loss: 1 mL  Intra-op Medications: Administrations occurring from 1215 to 1255 on 24:  * No intraprocedure medications in log *        Specimen: No specimens collected              Drains and/or Catheters: * None in log *    Tourniquet Times:     Total Tourniquet Time Documented:  Arm - Lower (Right) - 7 minutes  Total: Arm - Lower (Right) - 7 minutes      Implants:     Findings: Thickened transverse carpal ligament    Indications: Tomasa Pennington is an 29 y.o. female who is having surgery for Carpal tunnel syndrome on right [G56.01].  Patient had presented with right hand pain and numbness consistent with carpal tunnel syndrome.  She had electrodiagnostic tests which were confirmatory of it.  She tried and failed conservative management.  We then discussed surgical treatment options including a right carpal tunnel release.  I explained to her the risk, benefits alternatives of this procedure.  I explained to the patient that the risks of the procedure include but are not limited to infection, damage to nerves and blood vessels, continued numbness and pain,  as well as risks associate with anesthesia.  The patient voiced understanding and informed consent was obtained.    The patient was seen in the preoperative area. The risks, benefits, complications, treatment options, non-operative alternatives, expected recovery and outcomes were discussed with the patient. The possibilities of reaction to medication, pulmonary aspiration, injury to surrounding structures, bleeding, recurrent infection, the need for additional procedures, failure to diagnose a condition, and creating a complication requiring transfusion or operation were discussed with the patient. The patient concurred with the proposed plan, giving informed consent.  The site of surgery was properly noted/marked if necessary per policy. The patient has been actively warmed in preoperative area. Preoperative antibiotics are not indicated. Venous thrombosis prophylaxis are not indicated.    Procedure Details: The patient was properly identified in the preoperative waiting area and her right hand was marked as site of surgery.  Patient was taken back to the operating room suite placed supine on the OR table.  A nonsterile tourniquet was applied to the patient's right forearm.  After intravenous sedation was administered the patient's right upper extremity was prepped and draped in usual sterile fashion.  A preoperative verification timeout was taken.  10 mL of half percent plain Marcaine was injected as a local anesthetic.  Patient's right upper extremity was exsanguinated with an Esmarch bandage and the tourniquet was inflated to 250 mmHg.  Approximately a 1.5 inch longitudinal incision was made in the base of the palm in line with the third webspace.  Sharp dissection was carried through skin and subcutaneous tissue.  Electrocautery was used to achieve hemostasis.  The palmar fascia was identified and split.  Dissection was carried to the transverse carpal ligament which was then transected.  Antebrachial fascia  was released proximally.  Full decompression of the median nerve was confirmed.  Wound was irrigated with normal saline.  Skin was closed with 4-0 nylon suture.  Tourniquet was let down after 7 minutes.  Good vascular return was seen of the patient's right hand and all digits.  Sterile bandages consisting of Xeroform, gauze 4 x 4's, Webril and Ace wrap was applied to the patient's right hand.  Patient was awakened from anesthesia and returned to recovery in stable condition.  There are no complications in the case.  All sponge and needle counts were correct at the end of the case.  Complications:  None; patient tolerated the procedure well.    Disposition: PACU - hemodynamically stable.  Condition: stable         Task Performed by RNFA or Surgical Assistant:  The physician assistant helped with retraction, skin closure and application of the bandage          Additional Details: None    Attending Attestation: I performed the procedure.    Filippo Cornejo  Phone Number: [phone]

## 2024-12-27 NOTE — ANESTHESIA PREPROCEDURE EVALUATION
Patient: Tomasa Pennington    Procedure Information       Date/Time: 12/27/24 1215    Procedure: RIGHT CARPAL TUNNEL RELEASE (Right: Wrist)    Location: [facility] OR 07 / [facility]    Surgeons: Filippo Cornejo MD            Relevant Problems   Anesthesia (within normal limits)      Neuro   (+) Carpal tunnel syndrome on right      Musculoskeletal   (+) Carpal tunnel syndrome on right       Clinical information reviewed:   Tobacco  Allergies  Meds   Med Hx  Surg Hx  OB Status  Fam Hx          NPO Detail:  No data recorded     Physical Exam    Airway  Mallampati: II  TM distance: >3 FB  Neck ROM: full     Cardiovascular - normal exam  Rhythm: regular  Rate: normal     Dental        Pulmonary - normal exam  Breath sounds clear to auscultation     Abdominal            Anesthesia Plan    History of general anesthesia?: no  History of complications of general anesthesia?: no    ASA 2     MAC     The patient is a current smoker.  Patient was previously instructed to abstain from smoking on day of procedure.  Patient did not smoke on day of procedure.    intravenous induction   Anesthetic plan and risks discussed with patient.  Use of blood products discussed with patient who.    Plan discussed with CAA.

## 2024-12-27 NOTE — BRIEF OP NOTE
Date: 2024  OR Location: [facility]    Name: Tomasa Pennington, : 1995, Age: 29 y.o., MRN: [id number], Sex: female    Diagnosis  Pre-op Diagnosis      * Carpal tunnel syndrome on right [G56.01] Post-op Diagnosis     * Carpal tunnel syndrome on right [G56.01]     Procedures  RIGHT CARPAL TUNNEL RELEASE  23533 - [place] NEUROPLASTY &/TRANSPOS MEDIAN NRV CARPAL TUNNE      Surgeons      * Filippo Cornejo - Primary    Resident/Fellow/Other Assistant:  Surgeons and Role:  * No surgeons found with a matching role *    Staff:   Circulator: Annamarie  Surgical Assistant: Merna Glez Person: Jeronimo Doe Scrub: Georgia Doe Circulator: Billie    Anesthesia Staff: Anesthesiologist: Lico Vera MD  C-AA: CARI Hyman  SRNA: Melissa Hooper    Procedure Summary  Anesthesia: Monitor Anesthesia Care  ASA: II  Estimated Blood Loss: 1 mL  Intra-op Medications: Administrations occurring from 1215 to 1255 on 24:  * No intraprocedure medications in log *        Specimen: No specimens collected               Findings: Thickened transverse carpal ligament    Complications:  None; patient tolerated the procedure well.     Disposition: PACU - hemodynamically stable.  Condition: stable  Specimens Collected: No specimens collected  Attending Attestation: I performed the procedure.    Filippo Cornejo  Phone Number: [phone]

## 2025-01-06 ENCOUNTER — OFFICE VISIT (OUTPATIENT)
Dept: ORTHOPEDIC SURGERY | Facility: CLINIC | Age: 30
End: 2025-01-06
Payer: COMMERCIAL

## 2025-01-06 DIAGNOSIS — G56.01 CARPAL TUNNEL SYNDROME ON RIGHT: Primary | ICD-10-CM

## 2025-01-06 PROCEDURE — 99211 OFF/OP EST MAY X REQ PHY/QHP: CPT | Performed by: ORTHOPAEDIC SURGERY

## 2025-01-06 NOTE — PROGRESS NOTES
Subjective    Patient ID: Tomasa Pennington is a 29 y.o. female.    Chief Complaint: Postop Visit (RIGHT CARPAL TUNNEL RELEASE/DOS 12/27/24)     Last Surgery: Right Carpal Tunnel Release - Right  Last Surgery Date: 12/27/2024    HPI  Patient comes in for her postoperative visit after undergoing a right carpal tunnel release.  She has noticed improvement in sensation but still has paresthesias in her right thumb.  Pain has been minimal at the incision site.    Objective   Ortho Exam  The patient is in no acute distress.  Exam of her right hand reveals the incision is well-healed.  There is no evidence of infection.  Sutures are removed.  She has full range of motion in her fingers.    Assessment/Plan   Encounter Diagnoses:  Carpal tunnel syndrome on right    The patient is doing satisfactory following her right carpal tunnel release.  She was reassured takes more than just 2 weeks for the nerve to fully heal.  Tolerated.  She will follow-up as her symptoms dictate.

## 2025-01-06 NOTE — LETTER
January 6, 2025     Patient: Tomasa Pennington   YOB: 1995   Date of Visit: 1/6/2025       To Whom It May Concern:    It is my medical opinion that Tomasa Pennington  may return to work without restrictions on January 14, 2025 .    If you have any questions or concerns, please don't hesitate to call.         Sincerely,        Filippo Cornejo MD    CC: No Recipients

## (undated) DEVICE — Device

## (undated) DEVICE — CUFF, TOURNIQUET 12 DUAL PORT/SNGL BLAD"

## (undated) DEVICE — DRESSING, GAUZE, PETROLATUM, PATCH, XEROFORM, 1 X 8 IN, STERILE